# Patient Record
Sex: FEMALE | Race: ASIAN | Employment: FULL TIME | URBAN - METROPOLITAN AREA
[De-identification: names, ages, dates, MRNs, and addresses within clinical notes are randomized per-mention and may not be internally consistent; named-entity substitution may affect disease eponyms.]

---

## 2017-01-15 ENCOUNTER — TELEPHONE (OUTPATIENT)
Dept: INTERNAL MEDICINE CLINIC | Facility: CLINIC | Age: 57
End: 2017-01-15

## 2017-01-15 DIAGNOSIS — R91.1 PULMONARY NODULE: Primary | ICD-10-CM

## 2017-01-16 NOTE — TELEPHONE ENCOUNTER
pls call pt; she is due for her ffup ct chest for ffup of pulmonary nodule. We will submit to insurance order for CT scan chest to get preauthorization. Once approved, she should get her ct scan done at [facility] where she did her last ct scan.

## 2017-03-06 ENCOUNTER — TELEPHONE (OUTPATIENT)
Dept: INTERNAL MEDICINE CLINIC | Facility: CLINIC | Age: 57
End: 2017-03-06

## 2017-03-06 NOTE — TELEPHONE ENCOUNTER
pls call pt; she is due for ct chest for ffup of pulmonary nodule; order in epic and had been approved already

## 2017-03-07 NOTE — TELEPHONE ENCOUNTER
LMTCB. Please relate Dr. Mode Rob 3/6/17 message below that is due for CT of chest to v/u pulmonary nodule.

## 2017-03-28 NOTE — TELEPHONE ENCOUNTER
Pt notified she need to schedule an appointment to have the ct-scan of the chest completed. Pt stated she will go call today to make an appointment.

## 2017-04-01 ENCOUNTER — HOSPITAL ENCOUNTER (OUTPATIENT)
Dept: CT IMAGING | Facility: HOSPITAL | Age: 57
Discharge: HOME | End: 2017-04-01
Attending: INTERNAL MEDICINE
Payer: COMMERCIAL

## 2017-04-01 DIAGNOSIS — R91.1 PULMONARY NODULE: ICD-10-CM

## 2017-04-01 PROCEDURE — 71250 CT THORAX DX C-: CPT

## 2017-04-21 ENCOUNTER — TELEPHONE (OUTPATIENT)
Dept: INTERNAL MEDICINE CLINIC | Facility: CLINIC | Age: 57
End: 2017-04-21

## 2017-04-22 NOTE — TELEPHONE ENCOUNTER
Called pt and told her she needs to get old ct chest from [facility] so radiologist at [address] [place] [address] can compare since no baseline ct in [address] [place] [address] so we will not know if any new changes on the current ct scan.  Pt currently has no pulmonary symptoms and this ct scan was intended

## 2017-04-26 ENCOUNTER — TELEPHONE (OUTPATIENT)
Dept: INTERNAL MEDICINE CLINIC | Facility: CLINIC | Age: 57
End: 2017-04-26

## 2017-04-26 NOTE — TELEPHONE ENCOUNTER
Patient called regarding test results/concerns/questions cat scan done at [facility]  Patient would like  to view

## 2017-04-27 ENCOUNTER — TELEPHONE (OUTPATIENT)
Dept: INTERNAL MEDICINE CLINIC | Facility: CLINIC | Age: 57
End: 2017-04-27

## 2017-04-27 NOTE — TELEPHONE ENCOUNTER
Did she already got her old ct scan chest from [facility] and brought it to [facility] ct scan for comparison? I had talked to her last week to do this since [facility] ct scan has no old ct to compare the new one.  If she did this already, pls call Ct Scan at [facility] so we can ge

## 2017-04-27 NOTE — TELEPHONE ENCOUNTER
Spoke with patient and let her know that she will need to pick ct scan disc  From our office and take it to [facility] C. T. Dept, states she will pick it up tomorrow, envelope places on Dr. John Wagner desk.

## 2017-04-28 NOTE — TELEPHONE ENCOUNTER
Pt was called by nurse William Back yesterday per my order; pt to bring the CD of her ct to [address] [place] [address] CT dept for comparison. Pt will pickup from our office.

## 2017-05-03 ENCOUNTER — TELEPHONE (OUTPATIENT)
Dept: INTERNAL MEDICINE CLINIC | Facility: CLINIC | Age: 57
End: 2017-05-03

## 2017-05-03 DIAGNOSIS — R91.8 PULMONARY NODULES: Primary | ICD-10-CM

## 2017-06-12 ENCOUNTER — OFFICE VISIT (OUTPATIENT)
Dept: PULMONOLOGY | Facility: CLINIC | Age: 57
End: 2017-06-12

## 2017-06-12 VITALS
SYSTOLIC BLOOD PRESSURE: 134 MMHG | WEIGHT: 114 LBS | HEIGHT: 59 IN | OXYGEN SATURATION: 99 % | DIASTOLIC BLOOD PRESSURE: 80 MMHG | HEART RATE: 66 BPM | RESPIRATION RATE: 12 BRPM | BODY MASS INDEX: 22.98 KG/M2

## 2017-06-12 DIAGNOSIS — R91.8 PULMONARY NODULES: Primary | ICD-10-CM

## 2017-06-12 DIAGNOSIS — J47.9 BRONCHIECTASIS WITHOUT COMPLICATION (HCC): ICD-10-CM

## 2017-06-12 PROCEDURE — 99212 OFFICE O/P EST SF 10 MIN: CPT | Performed by: INTERNAL MEDICINE

## 2017-06-12 PROCEDURE — 99243 OFF/OP CNSLTJ NEW/EST LOW 30: CPT | Performed by: INTERNAL MEDICINE

## 2017-06-12 RX ORDER — CALCIUM CARBONATE/VITAMIN D3 600 MG-10
1 TABLET ORAL DAILY
COMMUNITY

## 2017-06-12 RX ORDER — IBUPROFEN 600 MG/1
600 TABLET ORAL EVERY 8 HOURS PRN
COMMUNITY

## 2017-06-12 RX ORDER — NAPROXEN SODIUM 220 MG
2 TABLET ORAL DAILY PRN
COMMUNITY

## 2017-06-12 NOTE — PROGRESS NOTES
Pulmonary Consult Note    HPI:   Jennifer Escalera is a 61year old female with Patient presents with:  Lung Problem: Ref by Dr. Brijesh Hernandez d/t two new lung nodules seen on recent CT.     Shawn Robins MD    \"pulmonary nodules\"    Pt states recent CT • stroke[other] [OTHER] Mother    • stroke[other] Luiz Deer Sister    • Hypertension Sister    • Hypertension Brother             PHYSICAL EXAM:   /80 mmHg  Pulse 66  Resp 12  Ht 4' 11\" (1.499 m)  Wt 114 lb (51.71 kg)  BMI 23.01 kg/m2  SpO2 99%  NA

## 2017-09-09 ENCOUNTER — LAB ENCOUNTER (OUTPATIENT)
Dept: LAB | Age: 57
End: 2017-09-09
Attending: INTERNAL MEDICINE
Payer: COMMERCIAL

## 2017-09-09 ENCOUNTER — OFFICE VISIT (OUTPATIENT)
Dept: INTERNAL MEDICINE CLINIC | Facility: CLINIC | Age: 57
End: 2017-09-09

## 2017-09-09 VITALS
HEART RATE: 68 BPM | WEIGHT: 117.38 LBS | SYSTOLIC BLOOD PRESSURE: 119 MMHG | BODY MASS INDEX: 23.66 KG/M2 | TEMPERATURE: 98 F | DIASTOLIC BLOOD PRESSURE: 74 MMHG | HEIGHT: 59 IN

## 2017-09-09 DIAGNOSIS — E78.2 MIXED HYPERLIPIDEMIA: ICD-10-CM

## 2017-09-09 DIAGNOSIS — Z12.39 BREAST CANCER SCREENING: ICD-10-CM

## 2017-09-09 DIAGNOSIS — Z00.00 ANNUAL PHYSICAL EXAM: Primary | ICD-10-CM

## 2017-09-09 DIAGNOSIS — R91.1 PULMONARY NODULE: ICD-10-CM

## 2017-09-09 DIAGNOSIS — Z00.00 ANNUAL PHYSICAL EXAM: ICD-10-CM

## 2017-09-09 DIAGNOSIS — Z01.419 WELL FEMALE EXAM WITH ROUTINE GYNECOLOGICAL EXAM: ICD-10-CM

## 2017-09-09 LAB
ALBUMIN SERPL BCP-MCNC: 4.2 G/DL (ref 3.5–4.8)
ALBUMIN/GLOB SERPL: 1.5 {RATIO} (ref 1–2)
ALP SERPL-CCNC: 60 U/L (ref 32–100)
ALT SERPL-CCNC: 37 U/L (ref 14–54)
ANION GAP SERPL CALC-SCNC: 6 MMOL/L (ref 0–18)
AST SERPL-CCNC: 22 U/L (ref 15–41)
BASOPHILS # BLD: 0 K/UL (ref 0–0.2)
BASOPHILS NFR BLD: 1 %
BILIRUB SERPL-MCNC: 2.7 MG/DL (ref 0.3–1.2)
BUN SERPL-MCNC: 13 MG/DL (ref 8–20)
BUN/CREAT SERPL: 19.7 (ref 10–20)
CALCIUM SERPL-MCNC: 8.9 MG/DL (ref 8.5–10.5)
CHLORIDE SERPL-SCNC: 108 MMOL/L (ref 95–110)
CHOLEST SERPL-MCNC: 157 MG/DL (ref 110–200)
CO2 SERPL-SCNC: 26 MMOL/L (ref 22–32)
CREAT SERPL-MCNC: 0.66 MG/DL (ref 0.5–1.5)
EOSINOPHIL # BLD: 0.1 K/UL (ref 0–0.7)
EOSINOPHIL NFR BLD: 1 %
ERYTHROCYTE [DISTWIDTH] IN BLOOD BY AUTOMATED COUNT: 12.2 % (ref 11–15)
GLOBULIN PLAS-MCNC: 2.8 G/DL (ref 2.5–3.7)
GLUCOSE SERPL-MCNC: 96 MG/DL (ref 70–99)
HCT VFR BLD AUTO: 40.6 % (ref 35–48)
HDLC SERPL-MCNC: 58 MG/DL
HGB BLD-MCNC: 14 G/DL (ref 12–16)
LDLC SERPL CALC-MCNC: 77 MG/DL (ref 0–99)
LYMPHOCYTES # BLD: 2.4 K/UL (ref 1–4)
LYMPHOCYTES NFR BLD: 38 %
MCH RBC QN AUTO: 32.3 PG (ref 27–32)
MCHC RBC AUTO-ENTMCNC: 34.5 G/DL (ref 32–37)
MCV RBC AUTO: 93.7 FL (ref 80–100)
MONOCYTES # BLD: 0.4 K/UL (ref 0–1)
MONOCYTES NFR BLD: 7 %
NEUTROPHILS # BLD AUTO: 3.3 K/UL (ref 1.8–7.7)
NEUTROPHILS NFR BLD: 54 %
NONHDLC SERPL-MCNC: 99 MG/DL
OSMOLALITY UR CALC.SUM OF ELEC: 290 MOSM/KG (ref 275–295)
PLATELET # BLD AUTO: 186 K/UL (ref 140–400)
PMV BLD AUTO: 8.9 FL (ref 7.4–10.3)
POTASSIUM SERPL-SCNC: 3.8 MMOL/L (ref 3.3–5.1)
PROT SERPL-MCNC: 7 G/DL (ref 5.9–8.4)
RBC # BLD AUTO: 4.34 M/UL (ref 3.7–5.4)
SODIUM SERPL-SCNC: 140 MMOL/L (ref 136–144)
TRIGL SERPL-MCNC: 112 MG/DL (ref 1–149)
WBC # BLD AUTO: 6.2 K/UL (ref 4–11)

## 2017-09-09 PROCEDURE — 36415 COLL VENOUS BLD VENIPUNCTURE: CPT

## 2017-09-09 PROCEDURE — 85025 COMPLETE CBC W/AUTO DIFF WBC: CPT

## 2017-09-09 PROCEDURE — 80061 LIPID PANEL: CPT

## 2017-09-09 PROCEDURE — 99396 PREV VISIT EST AGE 40-64: CPT | Performed by: INTERNAL MEDICINE

## 2017-09-09 PROCEDURE — 80053 COMPREHEN METABOLIC PANEL: CPT

## 2017-09-09 RX ORDER — ATORVASTATIN CALCIUM 10 MG/1
TABLET, FILM COATED ORAL
Qty: 90 TABLET | Refills: 1 | Status: SHIPPED | OUTPATIENT
Start: 2017-09-09 | End: 2018-04-23

## 2017-09-09 NOTE — PROGRESS NOTES
HPI:    Patient ID: Sue Mohamud is a 61year old female. Patient presents today for annual physical.        Review of Systems   Constitutional: Negative for appetite change, chills and fever. HENT: Negative. Eyes: Negative.     Respiratory: Negat wheezes. She has no rales. She exhibits no tenderness. Abdominal: Soft. Bowel sounds are normal. She exhibits no distension and no mass. There is no tenderness. There is no rebound and no guarding.    Genitourinary: Vagina normal and uterus normal.   Musc

## 2017-09-16 ENCOUNTER — HOSPITAL ENCOUNTER (OUTPATIENT)
Dept: MAMMOGRAPHY | Age: 57
Discharge: HOME | End: 2017-09-16
Attending: INTERNAL MEDICINE
Payer: COMMERCIAL

## 2017-09-16 DIAGNOSIS — Z12.39 BREAST CANCER SCREENING: ICD-10-CM

## 2017-09-16 PROCEDURE — 77067 SCR MAMMO BI INCL CAD: CPT | Performed by: INTERNAL MEDICINE

## 2017-09-29 ENCOUNTER — TELEPHONE (OUTPATIENT)
Dept: PULMONOLOGY | Facility: CLINIC | Age: 57
End: 2017-09-29

## 2017-10-02 ENCOUNTER — HOSPITAL ENCOUNTER (OUTPATIENT)
Dept: CT IMAGING | Facility: HOSPITAL | Age: 57
Discharge: HOME | End: 2017-10-02
Attending: INTERNAL MEDICINE
Payer: COMMERCIAL

## 2017-10-02 DIAGNOSIS — R91.8 PULMONARY NODULES: ICD-10-CM

## 2017-10-02 DIAGNOSIS — J47.9 BRONCHIECTASIS WITHOUT COMPLICATION (HCC): ICD-10-CM

## 2017-10-02 PROCEDURE — 71250 CT THORAX DX C-: CPT | Performed by: INTERNAL MEDICINE

## 2017-12-12 ENCOUNTER — TELEPHONE (OUTPATIENT)
Dept: PULMONOLOGY | Facility: CLINIC | Age: 57
End: 2017-12-12

## 2017-12-12 NOTE — TELEPHONE ENCOUNTER
See result note from 10-2-17. Pt notified repeat chest Ct will be due April 2018 and order will be mailed to her once due. Pt verbalized understanding.

## 2018-01-22 ENCOUNTER — NURSE TRIAGE (OUTPATIENT)
Dept: OTHER | Age: 58
End: 2018-01-22

## 2018-01-22 NOTE — TELEPHONE ENCOUNTER
Action Requested: Summary for Provider     []  Critical Lab, Recommendations Needed  [] Need Additional Advice  []   FYI    []   Need Orders  [] Need Medications Sent to Pharmacy  []  Other     SUMMARY:    Patient would like to know what can be done.

## 2018-01-23 ENCOUNTER — OFFICE VISIT (OUTPATIENT)
Dept: INTERNAL MEDICINE CLINIC | Facility: CLINIC | Age: 58
End: 2018-01-23

## 2018-01-23 VITALS
TEMPERATURE: 98 F | DIASTOLIC BLOOD PRESSURE: 71 MMHG | BODY MASS INDEX: 23.18 KG/M2 | WEIGHT: 115 LBS | RESPIRATION RATE: 12 BRPM | SYSTOLIC BLOOD PRESSURE: 106 MMHG | HEIGHT: 59 IN | HEART RATE: 67 BPM

## 2018-01-23 DIAGNOSIS — S39.012A STRAIN OF LUMBAR REGION, INITIAL ENCOUNTER: Primary | ICD-10-CM

## 2018-01-23 PROCEDURE — 99212 OFFICE O/P EST SF 10 MIN: CPT | Performed by: INTERNAL MEDICINE

## 2018-01-23 PROCEDURE — 99214 OFFICE O/P EST MOD 30 MIN: CPT | Performed by: INTERNAL MEDICINE

## 2018-01-23 RX ORDER — CYCLOBENZAPRINE HCL 10 MG
10 TABLET ORAL NIGHTLY
Qty: 14 TABLET | Refills: 0 | Status: SHIPPED | OUTPATIENT
Start: 2018-01-23 | End: 2018-02-12

## 2018-01-23 NOTE — PROGRESS NOTES
HPI:    Patient ID: Adelina Nye is a 61year old female. Back Pain   This is a new problem. The current episode started in the past 7 days. The problem occurs constantly. The pain is present in the lumbar spine.  The quality of the pain is descr sounds normal. No respiratory distress. She has no wheezes. She has no rales. Abdominal: Soft. Bowel sounds are normal. She exhibits no distension and no mass. There is no tenderness. There is no rebound and no guarding.    Musculoskeletal: She exhibits n

## 2018-02-25 RX ORDER — ATORVASTATIN CALCIUM 10 MG/1
TABLET, FILM COATED ORAL
Qty: 30 TABLET | Refills: 2 | Status: SHIPPED | OUTPATIENT
Start: 2018-02-25 | End: 2018-04-23

## 2018-03-28 ENCOUNTER — TELEPHONE (OUTPATIENT)
Dept: PULMONOLOGY | Facility: CLINIC | Age: 58
End: 2018-03-28

## 2018-04-03 NOTE — TELEPHONE ENCOUNTER
No authorization required for this patient. Thank you!  Rafe Dakins [phone] [facility]

## 2018-04-21 ENCOUNTER — HOSPITAL ENCOUNTER (OUTPATIENT)
Dept: CT IMAGING | Age: 58
Discharge: HOME | End: 2018-04-21
Attending: INTERNAL MEDICINE
Payer: COMMERCIAL

## 2018-04-21 DIAGNOSIS — R91.1 PULMONARY NODULE: ICD-10-CM

## 2018-04-21 PROCEDURE — 71250 CT THORAX DX C-: CPT | Performed by: INTERNAL MEDICINE

## 2018-04-23 RX ORDER — ATORVASTATIN CALCIUM 10 MG/1
TABLET, FILM COATED ORAL
Qty: 90 TABLET | Refills: 0 | Status: SHIPPED | OUTPATIENT
Start: 2018-04-23 | End: 2018-10-20

## 2018-05-09 ENCOUNTER — TELEPHONE (OUTPATIENT)
Dept: PULMONOLOGY | Facility: CLINIC | Age: 58
End: 2018-05-09

## 2018-05-09 DIAGNOSIS — R91.8 PULMONARY NODULES: Primary | ICD-10-CM

## 2018-05-10 NOTE — TELEPHONE ENCOUNTER
CT CHEST (XLY=[id number])   Order: [id number]   Status:  Final result   Visible to patient:  No (Not Released) Dx:  Pulmonary nodule   Notes recorded by Latia Anaya MD on 5/9/2018 at 9:47 PM CDT  Can we order a f/u CT scan for pulmonary nodule(s) in 6 month

## 2018-05-22 NOTE — TELEPHONE ENCOUNTER
Notified pt of Dr. Patty Magallon orders. Explained rx will be mailed about 1 mo prior to due date. She verbalized understanding.

## 2018-09-26 ENCOUNTER — TELEPHONE (OUTPATIENT)
Dept: PULMONOLOGY | Facility: CLINIC | Age: 58
End: 2018-09-26

## 2018-10-20 ENCOUNTER — HOSPITAL ENCOUNTER (OUTPATIENT)
Dept: CT IMAGING | Age: 58
Discharge: HOME | End: 2018-10-20
Attending: INTERNAL MEDICINE
Payer: COMMERCIAL

## 2018-10-20 ENCOUNTER — OFFICE VISIT (OUTPATIENT)
Dept: INTERNAL MEDICINE CLINIC | Facility: CLINIC | Age: 58
End: 2018-10-20
Payer: COMMERCIAL

## 2018-10-20 VITALS
HEIGHT: 59 IN | DIASTOLIC BLOOD PRESSURE: 70 MMHG | SYSTOLIC BLOOD PRESSURE: 110 MMHG | HEART RATE: 71 BPM | BODY MASS INDEX: 23.39 KG/M2 | WEIGHT: 116 LBS

## 2018-10-20 DIAGNOSIS — E78.2 MIXED HYPERLIPIDEMIA: ICD-10-CM

## 2018-10-20 DIAGNOSIS — M25.511 CHRONIC RIGHT SHOULDER PAIN: ICD-10-CM

## 2018-10-20 DIAGNOSIS — Z12.39 BREAST CANCER SCREENING: ICD-10-CM

## 2018-10-20 DIAGNOSIS — R91.8 PULMONARY NODULES: ICD-10-CM

## 2018-10-20 DIAGNOSIS — G89.29 CHRONIC RIGHT SHOULDER PAIN: ICD-10-CM

## 2018-10-20 DIAGNOSIS — Z00.00 ANNUAL PHYSICAL EXAM: Primary | ICD-10-CM

## 2018-10-20 DIAGNOSIS — R91.1 PULMONARY NODULE: ICD-10-CM

## 2018-10-20 PROCEDURE — 90471 IMMUNIZATION ADMIN: CPT | Performed by: INTERNAL MEDICINE

## 2018-10-20 PROCEDURE — 99396 PREV VISIT EST AGE 40-64: CPT | Performed by: INTERNAL MEDICINE

## 2018-10-20 PROCEDURE — 71250 CT THORAX DX C-: CPT | Performed by: INTERNAL MEDICINE

## 2018-10-20 PROCEDURE — 90686 IIV4 VACC NO PRSV 0.5 ML IM: CPT | Performed by: INTERNAL MEDICINE

## 2018-10-20 RX ORDER — ATORVASTATIN CALCIUM 10 MG/1
TABLET, FILM COATED ORAL
Qty: 90 TABLET | Refills: 1 | Status: SHIPPED | OUTPATIENT
Start: 2018-10-20 | End: 2019-04-27

## 2018-10-20 NOTE — PROGRESS NOTES
HPI:    Patient ID: Rodrick Bradley is a 60year old female. Patient presents today for her annual physical.       Hyperlipidemia   This is a chronic problem. The current episode started more than 1 year ago. The problem is controlled.  Recent lipid Neurological: Negative for syncope. Hematological: Negative.              Current Outpatient Medications:  ATORVASTATIN 10 MG Oral Tab TAKE 1 TABLET BY MOUTH AT BEDTIME  Disp: 90 tablet Rfl: 0   Calcium Carbonate-Vitamin D 600-400 MG-UNIT Oral Tab Take physical exam  (primary encounter diagnosis)  Plan: CBC WITH DIFFERENTIAL WITH PLATELET, COMP         METABOLIC PANEL (14), LIPID PANEL        Routine labs ordered. Pt advised to see gyne for her papsmear. She is up to date with her colonoscopy.  Flu shot t

## 2018-10-26 ENCOUNTER — TELEPHONE (OUTPATIENT)
Dept: PULMONOLOGY | Facility: CLINIC | Age: 58
End: 2018-10-26

## 2018-10-26 DIAGNOSIS — R91.8 PULMONARY NODULES: Primary | ICD-10-CM

## 2018-10-26 NOTE — TELEPHONE ENCOUNTER
----- Message from Jojo Howard MD sent at 10/24/2018  5:09 PM CDT -----  Can we order a f/u CT scan for pulmonary nodule(s) in 6 month from the last?  thx

## 2018-10-27 ENCOUNTER — LAB ENCOUNTER (OUTPATIENT)
Dept: LAB | Age: 58
End: 2018-10-27
Attending: INTERNAL MEDICINE
Payer: COMMERCIAL

## 2018-10-27 ENCOUNTER — HOSPITAL ENCOUNTER (OUTPATIENT)
Dept: GENERAL RADIOLOGY | Age: 58
Discharge: HOME | End: 2018-10-27
Attending: INTERNAL MEDICINE
Payer: COMMERCIAL

## 2018-10-27 DIAGNOSIS — G89.29 CHRONIC RIGHT SHOULDER PAIN: ICD-10-CM

## 2018-10-27 DIAGNOSIS — M25.511 CHRONIC RIGHT SHOULDER PAIN: ICD-10-CM

## 2018-10-27 DIAGNOSIS — Z00.00 ANNUAL PHYSICAL EXAM: ICD-10-CM

## 2018-10-27 PROCEDURE — 80053 COMPREHEN METABOLIC PANEL: CPT

## 2018-10-27 PROCEDURE — 80061 LIPID PANEL: CPT

## 2018-10-27 PROCEDURE — 36415 COLL VENOUS BLD VENIPUNCTURE: CPT

## 2018-10-27 PROCEDURE — 73030 X-RAY EXAM OF SHOULDER: CPT | Performed by: INTERNAL MEDICINE

## 2018-10-27 PROCEDURE — 85025 COMPLETE CBC W/AUTO DIFF WBC: CPT

## 2018-11-01 ENCOUNTER — TELEPHONE (OUTPATIENT)
Dept: PULMONOLOGY | Facility: CLINIC | Age: 58
End: 2018-11-01

## 2019-04-27 RX ORDER — ATORVASTATIN CALCIUM 10 MG/1
TABLET, FILM COATED ORAL
Qty: 90 TABLET | Refills: 1 | Status: SHIPPED | OUTPATIENT
Start: 2019-04-27 | End: 2019-12-30

## 2019-06-28 ENCOUNTER — HOSPITAL ENCOUNTER (OUTPATIENT)
Dept: CT IMAGING | Age: 59
Discharge: HOME | End: 2019-06-28
Attending: INTERNAL MEDICINE
Payer: COMMERCIAL

## 2019-06-28 DIAGNOSIS — R91.8 PULMONARY NODULES: ICD-10-CM

## 2019-06-28 PROCEDURE — 71250 CT THORAX DX C-: CPT | Performed by: INTERNAL MEDICINE

## 2019-07-01 ENCOUNTER — TELEPHONE (OUTPATIENT)
Dept: PULMONOLOGY | Facility: CLINIC | Age: 59
End: 2019-07-01

## 2019-07-01 NOTE — TELEPHONE ENCOUNTER
----- Message from Shady Orellana MD sent at 6/28/2019  1:54 PM CDT -----  Please let her know that the change on her ct chest appear stable since 2015. I do not think she needs any more screening CT chest exams.  She can f/u with me PRN

## 2019-11-30 ENCOUNTER — OFFICE VISIT (OUTPATIENT)
Dept: INTERNAL MEDICINE CLINIC | Facility: CLINIC | Age: 59
End: 2019-11-30
Payer: COMMERCIAL

## 2019-11-30 ENCOUNTER — LAB ENCOUNTER (OUTPATIENT)
Dept: LAB | Age: 59
End: 2019-11-30
Attending: INTERNAL MEDICINE
Payer: COMMERCIAL

## 2019-11-30 VITALS
BODY MASS INDEX: 22.58 KG/M2 | HEART RATE: 73 BPM | SYSTOLIC BLOOD PRESSURE: 113 MMHG | WEIGHT: 112 LBS | DIASTOLIC BLOOD PRESSURE: 68 MMHG | RESPIRATION RATE: 12 BRPM | TEMPERATURE: 98 F | HEIGHT: 59 IN

## 2019-11-30 DIAGNOSIS — Z00.00 ANNUAL PHYSICAL EXAM: ICD-10-CM

## 2019-11-30 DIAGNOSIS — E78.2 MIXED HYPERLIPIDEMIA: ICD-10-CM

## 2019-11-30 DIAGNOSIS — I25.10 ATHEROSCLEROSIS OF NATIVE CORONARY ARTERY OF NATIVE HEART WITHOUT ANGINA PECTORIS: ICD-10-CM

## 2019-11-30 DIAGNOSIS — Z01.419 WELL FEMALE EXAM WITH ROUTINE GYNECOLOGICAL EXAM: ICD-10-CM

## 2019-11-30 DIAGNOSIS — Z12.39 BREAST CANCER SCREENING: ICD-10-CM

## 2019-11-30 DIAGNOSIS — Z00.00 ANNUAL PHYSICAL EXAM: Primary | ICD-10-CM

## 2019-11-30 PROBLEM — G89.29 CHRONIC RIGHT SHOULDER PAIN: Status: RESOLVED | Noted: 2018-10-20 | Resolved: 2019-11-30

## 2019-11-30 PROBLEM — M25.511 CHRONIC RIGHT SHOULDER PAIN: Status: RESOLVED | Noted: 2018-10-20 | Resolved: 2019-11-30

## 2019-11-30 PROCEDURE — 85025 COMPLETE CBC W/AUTO DIFF WBC: CPT

## 2019-11-30 PROCEDURE — 99396 PREV VISIT EST AGE 40-64: CPT | Performed by: INTERNAL MEDICINE

## 2019-11-30 PROCEDURE — 36415 COLL VENOUS BLD VENIPUNCTURE: CPT

## 2019-11-30 PROCEDURE — 93000 ELECTROCARDIOGRAM COMPLETE: CPT | Performed by: INTERNAL MEDICINE

## 2019-11-30 PROCEDURE — 80061 LIPID PANEL: CPT

## 2019-11-30 PROCEDURE — 90686 IIV4 VACC NO PRSV 0.5 ML IM: CPT | Performed by: INTERNAL MEDICINE

## 2019-11-30 PROCEDURE — 90471 IMMUNIZATION ADMIN: CPT | Performed by: INTERNAL MEDICINE

## 2019-11-30 PROCEDURE — 80053 COMPREHEN METABOLIC PANEL: CPT

## 2019-11-30 NOTE — PROGRESS NOTES
History of Present Illness   Patient ID: Michelle Cloud is a 60year old female. Chief Complaint: Physical (annual physical)      Patient presents today for her annual physical.     Hyperlipidemia   This is a chronic problem.  The current episode sta exhibits no discharge. No scleral icterus. Neck: Normal range of motion. Neck supple. No JVD present. No thyromegaly present. Cardiovascular: Normal rate, regular rhythm, normal heart sounds and intact distal pulses.   Exam reveals no gallop and no fric Lavinia 138 (H) 11/30/2019    CALCNONHDL 180 (H) 08/13/2016     The 10-year ASCVD risk score (Stacie Madrid, et al., 2013) is: 2.5%    Values used to calculate the score:      Age: 60 years      Sex: Female      Is Non- : No      Diab daily., Disp: , Rfl:        Assessment & Plan    (Z00.00) Annual physical exam  (primary encounter diagnosis)  Plan: CBC WITH DIFFERENTIAL WITH PLATELET, COMP         METABOLIC PANEL (14), LIPID PANEL,         ELECTROCARDIOGRAM, COMPLETE        Routine lab

## 2019-12-30 NOTE — TELEPHONE ENCOUNTER
Current Outpatient Medications:   •  ATORVASTATIN 10 MG Oral Tab, TAKE 1 TABLET BY MOUTH AT BEDTIME , Disp: 90 tablet, Rfl: 1  •

## 2019-12-31 RX ORDER — ATORVASTATIN CALCIUM 10 MG/1
10 TABLET, FILM COATED ORAL NIGHTLY
Qty: 90 TABLET | Refills: 1 | Status: SHIPPED | OUTPATIENT
Start: 2019-12-31 | End: 2020-09-29

## 2019-12-31 NOTE — TELEPHONE ENCOUNTER
Refill passed per [facility] protocol.   Cholesterol Medications  Protocol Criteria:  · Appointment scheduled in the past 12 months or in the next 3 months  · ALT & LDL on file in the past 12 months  · ALT result < 80  · LDL result <130   Recent Outpat

## 2020-01-04 ENCOUNTER — HOSPITAL ENCOUNTER (OUTPATIENT)
Dept: MAMMOGRAPHY | Age: 60
Discharge: HOME | End: 2020-01-04
Attending: INTERNAL MEDICINE
Payer: COMMERCIAL

## 2020-01-04 DIAGNOSIS — Z12.39 BREAST CANCER SCREENING: ICD-10-CM

## 2020-01-04 PROCEDURE — 77063 BREAST TOMOSYNTHESIS BI: CPT | Performed by: INTERNAL MEDICINE

## 2020-01-04 PROCEDURE — 77067 SCR MAMMO BI INCL CAD: CPT | Performed by: INTERNAL MEDICINE

## 2020-01-30 ENCOUNTER — OFFICE VISIT (OUTPATIENT)
Dept: OBGYN CLINIC | Facility: CLINIC | Age: 60
End: 2020-01-30
Payer: COMMERCIAL

## 2020-01-30 VITALS
HEIGHT: 58 IN | DIASTOLIC BLOOD PRESSURE: 73 MMHG | HEART RATE: 91 BPM | BODY MASS INDEX: 23.09 KG/M2 | WEIGHT: 110 LBS | SYSTOLIC BLOOD PRESSURE: 114 MMHG

## 2020-01-30 DIAGNOSIS — Z01.411 ENCOUNTER FOR GYNECOLOGICAL EXAMINATION WITH ABNORMAL FINDING: Primary | ICD-10-CM

## 2020-01-30 DIAGNOSIS — L08.9 LOCAL SKIN INFECTION: ICD-10-CM

## 2020-01-30 LAB — HPV I/H RISK 1 DNA SPEC QL NAA+PROBE: NEGATIVE

## 2020-01-30 PROCEDURE — 99386 PREV VISIT NEW AGE 40-64: CPT | Performed by: OBSTETRICS & GYNECOLOGY

## 2020-01-30 NOTE — PROGRESS NOTES
Daphnie Segura is a 60year old female  No LMP recorded. Patient is postmenopausal. Patient presents with:  Gyn Exam: ANNUAL EXAM -- new pt. Last pap over 5 yrs ago  .     OBSTETRICS HISTORY:  OB History    Para Term  AB Living Stress: Not on file    Relationships      Social connections:        Talks on phone: Not on file        Gets together: Not on file        Attends [employer] service: Not on file        Active member of club or organization: Not on file        Attends mauro abdominal pain, frequent diarrhea or constipation, nausea / vomiting  Genitourinary:    denies dysuria, bothersome incontinence  Skin/Breast:   denies any breast pain, lumps, or discharge  Neurological:    denies frequent severe headaches  Psychiatric:   d if any vaginal bleeding. Encouraged 1500 mg calcium w/ vit D. Encouraged weight bearing exercise. Follow-up in one year.   See PCP today to evaluate skin infection (per pt there for one week & has not d/w PCP)

## 2020-01-31 LAB — LAST PAP RESULT: NORMAL

## 2020-02-04 ENCOUNTER — OFFICE VISIT (OUTPATIENT)
Dept: INTERNAL MEDICINE CLINIC | Facility: CLINIC | Age: 60
End: 2020-02-04
Payer: COMMERCIAL

## 2020-02-04 ENCOUNTER — HOSPITAL ENCOUNTER (OUTPATIENT)
Age: 60
Discharge: HOME | End: 2020-02-04
Attending: EMERGENCY MEDICINE
Payer: COMMERCIAL

## 2020-02-04 VITALS
HEIGHT: 59 IN | BODY MASS INDEX: 22.58 KG/M2 | OXYGEN SATURATION: 100 % | TEMPERATURE: 99 F | WEIGHT: 112 LBS | HEART RATE: 83 BPM | RESPIRATION RATE: 20 BRPM | SYSTOLIC BLOOD PRESSURE: 140 MMHG | DIASTOLIC BLOOD PRESSURE: 82 MMHG

## 2020-02-04 VITALS
HEART RATE: 82 BPM | WEIGHT: 110.19 LBS | SYSTOLIC BLOOD PRESSURE: 111 MMHG | DIASTOLIC BLOOD PRESSURE: 72 MMHG | TEMPERATURE: 99 F | BODY MASS INDEX: 23 KG/M2

## 2020-02-04 DIAGNOSIS — L02.91 ABSCESS: Primary | ICD-10-CM

## 2020-02-04 PROCEDURE — 99203 OFFICE O/P NEW LOW 30 MIN: CPT

## 2020-02-04 PROCEDURE — 99213 OFFICE O/P EST LOW 20 MIN: CPT

## 2020-02-04 PROCEDURE — 10061 I&D ABSCESS COMP/MULTIPLE: CPT

## 2020-02-04 RX ORDER — CEPHALEXIN 500 MG/1
500 CAPSULE ORAL 3 TIMES DAILY
Qty: 21 CAPSULE | Refills: 0 | Status: SHIPPED | OUTPATIENT
Start: 2020-02-04 | End: 2020-02-11

## 2020-02-04 RX ORDER — KETOROLAC TROMETHAMINE 10 MG/1
10 TABLET, FILM COATED ORAL EVERY 6 HOURS PRN
Qty: 15 TABLET | Refills: 0 | Status: SHIPPED | OUTPATIENT
Start: 2020-02-04 | End: 2020-02-11

## 2020-02-04 NOTE — ED INITIAL ASSESSMENT (HPI)
Pt has had an abscess on the left chest/rib area for 7 days. No fever. +redness, +warmth, and swelling. Sent by Dr. Mode salas today.

## 2020-02-04 NOTE — ED PROVIDER NOTES
Patient Seen in: [facility] [place]    History   Patient presents with:  Abscess    Stated Complaint: open sore on abd     HPI  Patient complains of skin infection for  7 days. Located l ant upper abd wall.   Notes no mass prio signs reviewed. All other systems reviewed and negative except as noted above. PSFH elements reviewed from today and agreed except as otherwise stated in HPI.     Physical Exam     ED Triage Vitals [02/04/20 1732]   /82   Pulse 83   Resp 20 0

## 2020-02-26 NOTE — H&P
[address] [facility] [address] Gastroenterology                                                                                                  Clinic History and Physical     Pa Laterality Date   • COLONOSCOPY      last time 2013 with small polyps; next would be 2018 per GI specialist   • OTHER SURGICAL HISTORY      left shoulder surgery      Family Hx:   Family History   Problem Relation Age of Onset   • Hypertension Father    • EXAM:   Blood pressure 120/78, pulse 91, height 4' 11\" (1.499 m), weight 114 lb 9.6 oz (52 kg).     Gen: patient appears comfortable and in no acute distress  HEENT: conjunctiva pink, the sclera appears anicteric, oropharynx clear, mucus membranes appear m and/or the day of the procedure(s) - N/A   - NO alcohol, recreational drugs nor erectile dysfunction medications 24 hours before procedure(s)   - NO herbal supplements or weight loss medications x 7 days prior to the procedure(s)    ** If MAC @ [facility] or IV t

## 2020-03-04 ENCOUNTER — HOSPITAL ENCOUNTER (OUTPATIENT)
Dept: CV DIAGNOSTICS | Facility: HOSPITAL | Age: 60
Discharge: HOME | End: 2020-03-04
Attending: INTERNAL MEDICINE
Payer: COMMERCIAL

## 2020-03-04 ENCOUNTER — TELEPHONE (OUTPATIENT)
Dept: GASTROENTEROLOGY | Facility: CLINIC | Age: 60
End: 2020-03-04

## 2020-03-04 ENCOUNTER — OFFICE VISIT (OUTPATIENT)
Dept: GASTROENTEROLOGY | Facility: CLINIC | Age: 60
End: 2020-03-04
Payer: COMMERCIAL

## 2020-03-04 VITALS
WEIGHT: 114.63 LBS | HEART RATE: 91 BPM | SYSTOLIC BLOOD PRESSURE: 120 MMHG | DIASTOLIC BLOOD PRESSURE: 78 MMHG | BODY MASS INDEX: 23.11 KG/M2 | HEIGHT: 59 IN

## 2020-03-04 DIAGNOSIS — Z86.010 HISTORY OF COLON POLYPS: Primary | ICD-10-CM

## 2020-03-04 DIAGNOSIS — I25.10 ATHEROSCLEROSIS OF NATIVE CORONARY ARTERY OF NATIVE HEART WITHOUT ANGINA PECTORIS: ICD-10-CM

## 2020-03-04 PROCEDURE — 93016 CV STRESS TEST SUPVJ ONLY: CPT | Performed by: INTERNAL MEDICINE

## 2020-03-04 PROCEDURE — 99203 OFFICE O/P NEW LOW 30 MIN: CPT | Performed by: NURSE PRACTITIONER

## 2020-03-04 PROCEDURE — 93018 CV STRESS TEST I&R ONLY: CPT | Performed by: INTERNAL MEDICINE

## 2020-03-04 PROCEDURE — 93017 CV STRESS TEST TRACING ONLY: CPT | Performed by: INTERNAL MEDICINE

## 2020-03-04 NOTE — PATIENT INSTRUCTIONS
-Schedule colonoscopy w/ Dr. Rosaline Bae, Dr. Alessia Lyle, Dr. Modesto Hardy with IV Twilight or MAC  Dx: hx colon polyps   -Eligible for NE: Yes  -Prep: Split dose Colyte/TriLyte or equivalent  -Anti-platelets and anti-coagulants: None  -Diabetes meds: None    ** If M

## 2020-03-04 NOTE — TELEPHONE ENCOUNTER
Scheduled for:  Colonoscopy [address]38   Provider Name:    Date:  4/28/2020  Location:  [facility]  Sedation:  Mac  Time:  10:00 --------- Arriving with her Spouse at 0830 Am   Prep:  Colyte or Trilyte  Meds/Allergies Reconciled?:  Physician reviewed    Diagn

## 2020-03-31 ENCOUNTER — TELEPHONE (OUTPATIENT)
Dept: INTERNAL MEDICINE CLINIC | Facility: CLINIC | Age: 60
End: 2020-03-31

## 2020-03-31 NOTE — TELEPHONE ENCOUNTER
LMTCB Need to relate 's message below, asking if patient has seen her cardiologist. Tell patient she cannot have colonoscopy done until she is cleared by her cardiologist. Office number given.

## 2020-03-31 NOTE — TELEPHONE ENCOUNTER
pls call pt if she already had seen cardiologist for her abnormal stress test. If not she really needs to. She can be at risk for heart attack.  Also she can do her colonoscopy until she is cleared by cardiologist.

## 2020-03-31 NOTE — TELEPHONE ENCOUNTER
Dr. Martina Schuster, do you mean patient CANNOT have colonoscopy done until she is cleared by cardiologist?

## 2020-04-03 ENCOUNTER — TELEPHONE (OUTPATIENT)
Dept: GASTROENTEROLOGY | Facility: CLINIC | Age: 60
End: 2020-04-03

## 2020-04-03 DIAGNOSIS — Z86.010 PERSONAL HISTORY OF COLONIC POLYPS: Primary | ICD-10-CM

## 2020-04-03 NOTE — TELEPHONE ENCOUNTER
# 2 LMTCB. Need to related Dr. Vivienne Del Rosario message below asking if patient has seen her cardiologist yet.  She cannot have colonoscopy done until she is cleared by the cardiologist.

## 2020-04-06 NOTE — TELEPHONE ENCOUNTER
Yes send pt certified letter; also notify gastro that pt should get her stress test done first before she can do her colonoscopy

## 2020-04-06 NOTE — TELEPHONE ENCOUNTER
# 3 attempt to contact patient. Phone rings 5 times and then goes to busy signal. Juanito Valencia to send letter?

## 2020-04-13 NOTE — TELEPHONE ENCOUNTER
Pt called back. States that her phone was not working for a time. She has an appt with  on 04/22. The colonoscopy was cancelled for now.

## 2020-04-21 NOTE — TELEPHONE ENCOUNTER
Patient declined      04/21/20 0900   Activity/Group Checklist   Group Community meeting  (Morning Stretch )   Attendance Did not attend   Attendance Duration (min) 16-30   Affect/Mood IRMA Rx filed in Chronic Calendar.

## 2020-04-28 ENCOUNTER — OFFICE VISIT (OUTPATIENT)
Dept: CARDIOLOGY CLINIC | Facility: CLINIC | Age: 60
End: 2020-04-28
Payer: COMMERCIAL

## 2020-04-28 VITALS
DIASTOLIC BLOOD PRESSURE: 60 MMHG | HEIGHT: 59 IN | RESPIRATION RATE: 16 BRPM | BODY MASS INDEX: 22.58 KG/M2 | WEIGHT: 112 LBS | HEART RATE: 76 BPM | SYSTOLIC BLOOD PRESSURE: 95 MMHG

## 2020-04-28 DIAGNOSIS — R94.39 ABNORMAL STRESS TEST: Primary | ICD-10-CM

## 2020-04-28 PROCEDURE — 99213 OFFICE O/P EST LOW 20 MIN: CPT | Performed by: NURSE PRACTITIONER

## 2020-04-28 NOTE — PROGRESS NOTES
Jim Segura is a 60year old female. Patient presents with:  Consult: discuss GXT  Hyperlipidemia    HPI:   Patient comes in today for consultation.  She sees Dr. Galindo Needs she has a history of pulmonary nodule for which a CT scan was done which had unspecified hyperlipidemia    • Pulmonary nodule 2016      Social History:  Social History    Tobacco Use      Smoking status: Never Smoker      Smokeless tobacco: Never Used    Alcohol use: No      Alcohol/week: 0.0 standard drinks    Drug use: No       R

## 2020-05-29 DIAGNOSIS — Z01.812 PRE-PROCEDURE LAB EXAM: Primary | ICD-10-CM

## 2020-09-29 RX ORDER — ATORVASTATIN CALCIUM 10 MG/1
TABLET, FILM COATED ORAL
Qty: 90 TABLET | Refills: 0 | Status: SHIPPED | OUTPATIENT
Start: 2020-09-29

## 2020-09-30 NOTE — TELEPHONE ENCOUNTER
99/30/2020  Called in requested medication to [facility] per voice mail Left message for patient to set appointment for office visit before next refill.

## 2024-10-28 NOTE — PROGRESS NOTES
HPI:    Patient ID: Joel Salomon is a 60year old female. Abscess   This is a new problem. The current episode started in the past 7 days (5 days). The problem occurs constantly. The problem has been gradually worsening.  Pertinent negatives incl
The patient is a 22y Female complaining of back pain general.

## (undated) NOTE — MR AVS SNAPSHOT
After Visit Summary   1/30/2020    Deidre Segura    MRN: [id number]           Visit Information     Date & Time  1/30/2020  9:40 AM Provider  Rebeca Mckenzie MD Department  [facility], [address] [place] [address] Dept.  Phone  [phone] to securely access your online medical record. [employer] allows you to send messages to your doctor, view test results, renew prescriptions and request appointments. How Do I Sign Up? 1. In your Internet browser, go to [email or web address]. c complete it and provide feedback. We strive to deliver the best patient experience and are looking for ways to make improvements. Your feedback will help us do so. For more information on [employer], please visit [email or web address] Life-threatening emergencies needing immediate intervention at a hospital emergency room.     Average cost  $2,300*   *Cost varies based on your insurance coverage  For more information about hours, locations or appointment options available at [facility],   visit

## (undated) NOTE — LETTER
4/6/2020              Nishi Segura        [address] [place] [address]         Dear Anrdes Craig,    We have been unable to contact you by telephone.   We need to advise you that you need to have the cardiac stress test done a

## (undated) NOTE — LETTER
[address]  [address] [facility] [address]      9/26/2018    Dear Torie Romo,    It has come to our attention that a required CT CHEST test is due in OCTOBER. This test was ordered by Dr. Tati Esparza.     This test requires a prior aut

## (undated) NOTE — LETTER
3/28/2018    Dear Stacia Edge,     It has come to our attention that the enclosed required test is due in April. This test was ordered by Dr. Hyun Benoit. This test requires a prior authorization.  The [facility] at [phone] will ob

## (undated) NOTE — LETTER
1/23/2018              Nishi Segura        [address] [facility] [address] [facility] [address]         To whom it may concern,      This is to certify that Ms Mele Ramirez was seen and evaluated in our clinic today.  She should be excused f

## (undated) NOTE — MR AVS SNAPSHOT
[address], [address]  [address] [facility]  [phone]               Thank you for choosing us for your health care visit with Jennifer Carrington MD.  We are glad to serve you and happy to provide you with this summary Bronchiectasis without complication          Instructions and Information about Your Health    Please get a follow up scan of the chest in 10/17       Allergies as of Jun 12, 2017     Seasonal     Sneezing and itchy nose                Today's Vital Signs